# Patient Record
Sex: MALE | URBAN - METROPOLITAN AREA
[De-identification: names, ages, dates, MRNs, and addresses within clinical notes are randomized per-mention and may not be internally consistent; named-entity substitution may affect disease eponyms.]

---

## 2021-11-12 ENCOUNTER — OFFICE VISIT (OUTPATIENT)
Dept: URBAN - METROPOLITAN AREA CLINIC 5 | Facility: CLINIC | Age: 86
End: 2021-11-12
Payer: COMMERCIAL

## 2021-11-12 DIAGNOSIS — H01.001 UNSPECIFIED BLEPARITIS OF RIGHT UPPER EYELID: ICD-10-CM

## 2021-11-12 DIAGNOSIS — H35.342 MACULAR CYST, HOLE, OR PSEUDOHOLE, LEFT EYE: ICD-10-CM

## 2021-11-12 DIAGNOSIS — G24.5 BLEPHAROSPASM: Primary | ICD-10-CM

## 2021-11-12 DIAGNOSIS — H04.123 DRY EYE SYNDROME OF BILATERAL LACRIMAL GLANDS: ICD-10-CM

## 2021-11-12 DIAGNOSIS — H40.013 OPEN ANGLE WITH BORDERLINE FINDINGS, LOW RISK, BILATERAL: ICD-10-CM

## 2021-11-12 DIAGNOSIS — H01.004 UNSPECIFIED BLEPHARITIS OF LEFT UPPER EYELID: ICD-10-CM

## 2021-11-12 PROCEDURE — 92133 CPTRZD OPH DX IMG PST SGM ON: CPT | Performed by: OPTOMETRIST

## 2021-11-12 PROCEDURE — 92004 COMPRE OPH EXAM NEW PT 1/>: CPT | Performed by: OPTOMETRIST

## 2021-11-12 ASSESSMENT — INTRAOCULAR PRESSURE
OS: 8
OD: 9

## 2021-11-12 NOTE — IMPRESSION/PLAN
Impression: Dry eye syndrome of bilateral lacrimal glands: H04.123. Plan: Dry eyes - Recommend use of high quality artificial tears 3-4x per day prn.

## 2021-11-12 NOTE — IMPRESSION/PLAN
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 Plan: Not full thickness. Detected on MOCT today. Does not explain severely poor vision both eyes. Recommend refract next visit.

## 2021-11-12 NOTE — IMPRESSION/PLAN
Impression: Open angle with borderline findings, low risk, bilateral: H40.013. Plan: Pt has very large C/D ratios but RNFL shows surprisingly normal RNFL thicknesses at 79/81 with no focal thin quadrants and IOP low/normal.  Plan to monitor without treatment for now.

## 2021-11-12 NOTE — IMPRESSION/PLAN
Impression: Unspecified bleparitis of right upper eyelid: H01.001. Plan: Blepharitis/Meibomian Gland Dysfunction - Glands expressed in office. Start lid hygiene and warm compresses daily. Discussed with the patient benefits of flaxseed oil or omega 3 supplements. Instructions given.